# Patient Record
Sex: MALE | Race: WHITE | NOT HISPANIC OR LATINO | URBAN - METROPOLITAN AREA
[De-identification: names, ages, dates, MRNs, and addresses within clinical notes are randomized per-mention and may not be internally consistent; named-entity substitution may affect disease eponyms.]

---

## 2018-08-21 ENCOUNTER — EMERGENCY (EMERGENCY)
Facility: HOSPITAL | Age: 23
LOS: 0 days | Discharge: HOME | End: 2018-08-21
Attending: EMERGENCY MEDICINE | Admitting: EMERGENCY MEDICINE
Payer: COMMERCIAL

## 2018-08-21 VITALS
RESPIRATION RATE: 16 BRPM | TEMPERATURE: 98 F | DIASTOLIC BLOOD PRESSURE: 58 MMHG | HEART RATE: 68 BPM | OXYGEN SATURATION: 99 % | SYSTOLIC BLOOD PRESSURE: 122 MMHG

## 2018-08-21 VITALS — HEIGHT: 69 IN | WEIGHT: 179.9 LBS
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
## 2018-08-21 DIAGNOSIS — H57.12 OCULAR PAIN, LEFT EYE: ICD-10-CM

## 2018-08-21 DIAGNOSIS — S05.02XA INJURY OF CONJUNCTIVA AND CORNEAL ABRASION WITHOUT FOREIGN BODY, LEFT EYE, INITIAL ENCOUNTER: ICD-10-CM
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
## 2018-08-21 PROCEDURE — 99283 EMERGENCY DEPT VISIT LOW MDM: CPT

## 2018-08-21 RX ORDER — OFLOXACIN 0.3 %
1 DROPS OPHTHALMIC (EYE)
Qty: 10 | Refills: 0 | OUTPATIENT
Start: 2018-08-21 | End: 2018-08-27

## 2018-08-21 RX ORDER — ERYTHROMYCIN BASE 5 MG/GRAM
1 OINTMENT (GRAM) OPHTHALMIC (EYE)
Qty: 3.5 | Refills: 0 | OUTPATIENT
Start: 2018-08-21 | End: 2018-08-27

## 2018-08-21 NOTE — ED STATDOCS - PROGRESS NOTE DETAILS
22 y/o M with no PMH presents with foreign body sensation to left eye today. Pt states he was working on his car and believes a particle of metal entered his eye. Reports eye redness and FB sensation. States he attempted to remove particle prior to arrival in ED. Denies blurry vision, floaters, double vision. Does not wear eye glasses or contacts. tetanus is up to date. PE: PERRLA, EOMI. Acuity: 20/20 OD & OS. Fluorescein stain: negative for Foreign body or abrasion. A/P: FB sensation. Flush with Jose lens. Will manage as corneal abrasion and refer to opthalmology. - Chandana Jaam PA-C 24 y/o M with no PMH presents with foreign body sensation to left eye today. Pt states he was working on his car and believes a particle of metal entered his eye. Reports eye redness and FB sensation. States he attempted to remove particle prior to arrival in ED. Denies blurry vision, floaters, double vision. Does not wear eye glasses or contacts. tetanus is up to date. PE: PERRLA, EOMI. Acuity: 20/20 OD & OS. Fluorescein stain: negative for Foreign body or abrasion. No obvious injury or foreign body under eyelid. A/P: FB sensation. Flush with Jose lens. Will manage as corneal abrasion and refer to opthalmology. - Chandana Jama PA-C Eye flushed with 50cc normal saline using greg lens. Pt reports continued FB sensation. No obvious Foreign body detected. - Chandana Jama PA-C

## 2018-08-21 NOTE — ED STATDOCS - NS ED ROS FT
Constitutional: No fever or chills  Eyes: No visual changes. +foreign body, pain redness.  HEENT: No throat pain  CV: No chest pain  Resp: No SOB no cough  GI: No abd pain, nausea or vomiting  : No dysuria  MSK: No musculoskeletal pain  Skin: No rash  Neuro: No headache

## 2018-08-21 NOTE — ED STATDOCS - NS_ ATTENDINGSCRIBEDETAILS _ED_A_ED_FT
I, Jamison Patiño MD,  performed the initial face to face bedside interview with this patient regarding history of present illness, review of symptoms and relevant past medical, social and family history.  I completed an independent physical examination.  I was the initial provider who evaluated this patient.  The history, relevant review of systems, past medical and surgical history, medical decision making, and physical examination was documented by the scribe in my presence and I attest to the accuracy of the documentation.

## 2018-08-21 NOTE — ED ADULT NURSE NOTE - OBJECTIVE STATEMENT
presents to the ED with foreign body to L eye. Notes that he was working on car and that piece of metal entered eye. Denies blurry vision, visual changes, double vision.

## 2018-08-21 NOTE — ED STATDOCS - OBJECTIVE STATEMENT
24 y/o male with no PMHx presents to the ED c/o foreign body to L eye. +redness, pain. Notes that he was working on car and suspect that piece of metal entered eye. Denies blurry vision, visual changes, double vision. Denies trauma, injury. No corrective lenses. Tetanus UTD. Nonsmoker. No EtOH. No illicit drugs. NKDA. 24 y/o male with no PMHx presents to the ED c/o foreign body to L eye. +redness, pain. Notes that he was working on car and suspect that piece of metal entered eye. was not pressurized just something fell into his eye Denies blurry vision, visual changes, double vision. Denies trauma, injury. No corrective lenses. Tetanus UTD. Nonsmoker. No EtOH. No illicit drugs. NKDA.

## 2018-08-21 NOTE — ED STATDOCS - PHYSICAL EXAMINATION
Constitutional: mild distress AAOx3  Eyes: PERRLA EOMI  Head: Normocephalic atraumatic  Mouth: MMM  Cardiac: regular rate   Resp: Lungs CTAB  GI: Abd s/nt/nd  Neuro: CN2-12 intact  Skin: No rashes Constitutional: NAD AAOx3  Eyes: PERRLA EOMI (see PA note for visual acuity and florescence testing)  Head: Normocephalic atraumatic  Mouth: MMM  Cardiac: regular rate   Resp: Lungs CTAB  GI: Abd s/nt/nd  Neuro: CN2-12 intact  Skin: No rashes

## 2018-08-21 NOTE — ED ADULT NURSE NOTE - NSIMPLEMENTINTERV_GEN_ALL_ED
Implemented All Universal Safety Interventions:  [place] to call system. Call bell, personal items and telephone within reach. Instruct patient to call for assistance. Room bathroom lighting operational. Non-slip footwear when patient is off stretcher. Physically safe environment: no spills, clutter or unnecessary equipment. Stretcher in lowest position, wheels locked, appropriate side rails in place.

## 2018-08-21 NOTE — ED STATDOCS - MEDICAL DECISION MAKING DETAILS
24 y/o male presents to the ED with L eye discomfort,. pt was working on car when he felt no particle enter eye. no visual changes. saw particle in eye. not a contact lens wearer. basic exam does not show redness, foreign body. visual acuity, stain eye to look for corneal abrasion/ foreign body, sx control, reassess. 24 y/o male presents to the ED with L eye discomfort,. pt was working on car when he felt small particle enter eye. no visual changes. saw particle in eye. not a contact lens wearer. basic exam does not show redness, foreign body. visual acuity, stain eye to look for corneal abrasion/ foreign body, sx control, reassess. 24 y/o male presents to the ED with L eye discomfort,. pt was working on car when he felt small particle enter eye. no visual changes. saw particle in eye. not a contact lens wearer. basic exam does not show redness or foreign body. will obtain visual acuity, stain eye to look for corneal abrasion/ foreign body, sx control, reassess.

## 2018-08-21 NOTE — ED ADULT NURSE NOTE - NSFALLRSKASSESSDT_ED_ALL_ED

## 2021-12-13 NOTE — ED STATDOCS - DISCHARGE DATE
Selvin Botello is a 42 year old male presenting for   Chief Complaint   Patient presents with   • Office Visit   • Physical     Denies Latex allergy or sensitivity.    Medication verified and med list updated  Refills needed today: No    Health Maintenance Due   Topic Date Due   • Pneumococcal Vaccine 0-64 (1 of 2 - PPSV23) Never done   • COVID-19 Vaccine (1) Never done   • Influenza Vaccine (1) Never done       Patient is due for topics as listed above but is not proceeding with Immunization(s) COVID-19, Influenza and Pneumococcal at this time. Orders placed for Depression Screening .          Last lab results:   No results found for: HGBA1C  Cholesterol (mg/dL)   Date Value   01/25/2021 289 (H)     HDL (mg/dL)   Date Value   01/25/2021 102     Triglycerides (mg/dL)   Date Value   01/25/2021 53     LDL (mg/dL)   Date Value   01/25/2021 176 (H)     No results found for: URMIC, UCR, MALBCR  No results found for: IFOB              Depression Screening:  Recent Review Flowsheet Data     Date 12/13/2021    Adult PHQ 2 Score 0    Adult PHQ 2 Interpretation No further screening needed    Little interest or pleasure in activity? 0    Feeling down, depressed or hopeless? 0           Advance Directives:  not discussed     21-Aug-2018

## 2024-05-30 ENCOUNTER — RX ONLY (RX ONLY)
Age: 29
End: 2024-05-30

## 2024-05-30 ENCOUNTER — OFFICE (OUTPATIENT)
Dept: URBAN - METROPOLITAN AREA CLINIC 104 | Facility: CLINIC | Age: 29
Setting detail: OPHTHALMOLOGY
End: 2024-05-30
Payer: COMMERCIAL

## 2024-05-30 DIAGNOSIS — S05.01XA: ICD-10-CM

## 2024-05-30 PROCEDURE — 99203 OFFICE O/P NEW LOW 30 MIN: CPT | Performed by: OPHTHALMOLOGY

## 2024-05-30 ASSESSMENT — CONFRONTATIONAL VISUAL FIELD TEST (CVF)
OS_FINDINGS: FULL
OD_FINDINGS: FULL

## 2024-06-06 ENCOUNTER — OFFICE (OUTPATIENT)
Dept: URBAN - METROPOLITAN AREA CLINIC 104 | Facility: CLINIC | Age: 29
Setting detail: OPHTHALMOLOGY
End: 2024-06-06
Payer: COMMERCIAL

## 2024-06-06 DIAGNOSIS — S05.01XD: ICD-10-CM

## 2024-06-06 PROCEDURE — 99213 OFFICE O/P EST LOW 20 MIN: CPT | Performed by: OPHTHALMOLOGY

## 2024-06-06 ASSESSMENT — CONFRONTATIONAL VISUAL FIELD TEST (CVF)
OS_FINDINGS: FULL
OD_FINDINGS: FULL

## 2025-07-23 ENCOUNTER — RX ONLY (RX ONLY)
Age: 30
End: 2025-07-23

## 2025-07-23 ENCOUNTER — OFFICE (OUTPATIENT)
Dept: URBAN - METROPOLITAN AREA CLINIC 112 | Facility: CLINIC | Age: 30
Setting detail: OPHTHALMOLOGY
End: 2025-07-23
Payer: COMMERCIAL

## 2025-07-23 DIAGNOSIS — H18.831: ICD-10-CM

## 2025-07-23 DIAGNOSIS — S05.01XD: ICD-10-CM

## 2025-07-23 PROCEDURE — 92012 INTRM OPH EXAM EST PATIENT: CPT | Performed by: OPHTHALMOLOGY

## 2025-07-23 ASSESSMENT — VISUAL ACUITY
OS_BCVA: 20/20
OD_BCVA: 20/20

## 2025-07-23 ASSESSMENT — REFRACTION_AUTOREFRACTION
OS_CYLINDER: -0.25
OD_CYLINDER: -0.25
OS_SPHERE: +1.00
OS_AXIS: 162
OD_AXIS: 018
OD_SPHERE: +0.75

## 2025-07-23 ASSESSMENT — KERATOMETRY
OD_AXISANGLE_DEGREES: 085
OS_K2POWER_DIOPTERS: 43.25
OS_K1POWER_DIOPTERS: 42.50
OD_K1POWER_DIOPTERS: 42.25
OD_K2POWER_DIOPTERS: 43.25
OS_AXISANGLE_DEGREES: 093

## 2025-07-23 ASSESSMENT — CONFRONTATIONAL VISUAL FIELD TEST (CVF)
OS_FINDINGS: FULL
OD_FINDINGS: FULL

## 2025-07-23 ASSESSMENT — CORNEAL TRAUMA - ABRASION: OD_ABRASION: ABSENT

## 2025-07-23 ASSESSMENT — TONOMETRY
OD_IOP_MMHG: 16
OS_IOP_MMHG: 17